# Patient Record
Sex: MALE | Race: BLACK OR AFRICAN AMERICAN | Employment: FULL TIME | URBAN - METROPOLITAN AREA
[De-identification: names, ages, dates, MRNs, and addresses within clinical notes are randomized per-mention and may not be internally consistent; named-entity substitution may affect disease eponyms.]

---

## 2017-10-20 ENCOUNTER — HOSPITAL ENCOUNTER (OUTPATIENT)
Dept: LAB | Age: 52
Discharge: HOME | End: 2017-10-20

## 2017-10-20 LAB — POTASSIUM SERPL-SCNC: 5.5 MMOL/L (ref 3.5–5.1)

## 2017-10-20 PROCEDURE — 84132 ASSAY OF SERUM POTASSIUM: CPT | Performed by: INTERNAL MEDICINE

## 2017-10-23 ENCOUNTER — HOSPITAL ENCOUNTER (OUTPATIENT)
Dept: LAB | Age: 52
Discharge: HOME | End: 2017-10-23

## 2017-10-23 LAB — POTASSIUM SERPL-SCNC: 4.4 MMOL/L (ref 3.5–5.1)

## 2017-10-23 PROCEDURE — 84132 ASSAY OF SERUM POTASSIUM: CPT | Performed by: INTERNAL MEDICINE

## 2017-12-15 ENCOUNTER — HOSPITAL ENCOUNTER (OUTPATIENT)
Dept: LAB | Age: 52
Discharge: HOME | End: 2017-12-15

## 2017-12-15 LAB — POTASSIUM SERPL-SCNC: 3.3 MMOL/L (ref 3.5–5.1)

## 2017-12-15 PROCEDURE — 84132 ASSAY OF SERUM POTASSIUM: CPT | Performed by: INTERNAL MEDICINE

## 2017-12-18 ENCOUNTER — HOSPITAL ENCOUNTER (OUTPATIENT)
Dept: LAB | Age: 52
Discharge: HOME | End: 2017-12-18

## 2017-12-18 LAB — POTASSIUM SERPL-SCNC: 5.9 MMOL/L (ref 3.5–5.1)

## 2017-12-18 PROCEDURE — 84132 ASSAY OF SERUM POTASSIUM: CPT | Performed by: INTERNAL MEDICINE

## 2017-12-29 ENCOUNTER — HOSPITAL ENCOUNTER (OUTPATIENT)
Dept: LAB | Age: 52
Discharge: HOME | End: 2017-12-29

## 2017-12-29 LAB — POTASSIUM SERPL-SCNC: 5.6 MMOL/L (ref 3.5–5.1)

## 2017-12-29 PROCEDURE — 84132 ASSAY OF SERUM POTASSIUM: CPT | Performed by: INTERNAL MEDICINE

## 2018-04-13 ENCOUNTER — HOSPITAL ENCOUNTER (OUTPATIENT)
Dept: LAB | Age: 53
Discharge: HOME | End: 2018-04-13

## 2018-04-13 LAB — POTASSIUM SERPL-SCNC: 6.1 MMOL/L (ref 3.5–5.1)

## 2018-04-13 PROCEDURE — 84132 ASSAY OF SERUM POTASSIUM: CPT | Performed by: INTERNAL MEDICINE

## 2018-04-16 ENCOUNTER — HOSPITAL ENCOUNTER (OUTPATIENT)
Dept: LAB | Age: 53
Discharge: HOME | End: 2018-04-16

## 2018-04-16 LAB — POTASSIUM SERPL-SCNC: 6 MMOL/L (ref 3.5–5.1)

## 2018-04-16 PROCEDURE — 84132 ASSAY OF SERUM POTASSIUM: CPT | Performed by: INTERNAL MEDICINE

## 2019-08-16 ENCOUNTER — HOSPITAL ENCOUNTER (OUTPATIENT)
Dept: LAB | Age: 54
Discharge: HOME | End: 2019-08-16

## 2019-08-16 LAB
COMMENT, HOLDF: NORMAL
POTASSIUM SERPL-SCNC: 6.4 MMOL/L (ref 3.5–5.1)
SAMPLES BEING HELD,HOLD: NORMAL

## 2019-08-16 PROCEDURE — 99000 SPECIMEN HANDLING OFFICE-LAB: CPT

## 2019-08-16 PROCEDURE — 84132 ASSAY OF SERUM POTASSIUM: CPT

## 2019-08-16 PROCEDURE — 73060999999 HC MISC LAB CHARGE

## 2020-04-22 ENCOUNTER — HOSPITAL ENCOUNTER (OUTPATIENT)
Dept: LAB | Age: 55
Discharge: HOME | End: 2020-04-22

## 2020-04-22 LAB — POTASSIUM SERPL-SCNC: 6 MMOL/L (ref 3.5–5.1)

## 2020-04-22 PROCEDURE — 84132 ASSAY OF SERUM POTASSIUM: CPT

## 2020-04-22 PROCEDURE — 99000 SPECIMEN HANDLING OFFICE-LAB: CPT

## 2020-05-15 ENCOUNTER — HOSPITAL ENCOUNTER (OUTPATIENT)
Dept: LAB | Age: 55
Discharge: HOME | End: 2020-05-15

## 2020-05-15 LAB — POTASSIUM SERPL-SCNC: 5 MMOL/L (ref 3.5–5.1)

## 2020-05-15 PROCEDURE — 99000 SPECIMEN HANDLING OFFICE-LAB: CPT

## 2020-05-15 PROCEDURE — 84132 ASSAY OF SERUM POTASSIUM: CPT

## 2024-03-25 ENCOUNTER — OFFICE VISIT (OUTPATIENT)
Age: 59
End: 2024-03-25
Payer: MEDICARE

## 2024-03-25 VITALS
BODY MASS INDEX: 33.37 KG/M2 | HEART RATE: 87 BPM | HEIGHT: 74 IN | SYSTOLIC BLOOD PRESSURE: 124 MMHG | TEMPERATURE: 97.5 F | OXYGEN SATURATION: 97 % | WEIGHT: 260 LBS | RESPIRATION RATE: 18 BRPM | DIASTOLIC BLOOD PRESSURE: 70 MMHG

## 2024-03-25 DIAGNOSIS — Z94.0 S/P KIDNEY TRANSPLANT: ICD-10-CM

## 2024-03-25 DIAGNOSIS — C64.9 MALIGNANT NEOPLASM OF KIDNEY, UNSPECIFIED LATERALITY (HCC): ICD-10-CM

## 2024-03-25 DIAGNOSIS — Z86.73 HISTORY OF CVA (CEREBROVASCULAR ACCIDENT): ICD-10-CM

## 2024-03-25 DIAGNOSIS — R41.3 MEMORY LOSS, LONG TERM: Primary | ICD-10-CM

## 2024-03-25 PROCEDURE — G8417 CALC BMI ABV UP PARAM F/U: HCPCS | Performed by: PSYCHIATRY & NEUROLOGY

## 2024-03-25 PROCEDURE — G8427 DOCREV CUR MEDS BY ELIG CLIN: HCPCS | Performed by: PSYCHIATRY & NEUROLOGY

## 2024-03-25 PROCEDURE — 99204 OFFICE O/P NEW MOD 45 MIN: CPT | Performed by: PSYCHIATRY & NEUROLOGY

## 2024-03-25 PROCEDURE — 4004F PT TOBACCO SCREEN RCVD TLK: CPT | Performed by: PSYCHIATRY & NEUROLOGY

## 2024-03-25 PROCEDURE — G8484 FLU IMMUNIZE NO ADMIN: HCPCS | Performed by: PSYCHIATRY & NEUROLOGY

## 2024-03-25 PROCEDURE — 3078F DIAST BP <80 MM HG: CPT | Performed by: PSYCHIATRY & NEUROLOGY

## 2024-03-25 PROCEDURE — 3074F SYST BP LT 130 MM HG: CPT | Performed by: PSYCHIATRY & NEUROLOGY

## 2024-03-25 PROCEDURE — 3017F COLORECTAL CA SCREEN DOC REV: CPT | Performed by: PSYCHIATRY & NEUROLOGY

## 2024-03-25 RX ORDER — OMEPRAZOLE 40 MG/1
40 CAPSULE, DELAYED RELEASE ORAL DAILY
COMMUNITY
Start: 2024-01-16

## 2024-03-25 RX ORDER — PREDNISONE 5 MG/1
5 TABLET ORAL EVERY MORNING
COMMUNITY
Start: 2024-03-19

## 2024-03-25 RX ORDER — ISOSORBIDE MONONITRATE 120 MG/1
120 TABLET, EXTENDED RELEASE ORAL EVERY MORNING
COMMUNITY
Start: 2024-01-14

## 2024-03-25 RX ORDER — MYCOPHENOLATE MOFETIL 250 MG/1
250 CAPSULE ORAL 2 TIMES DAILY
COMMUNITY
Start: 2024-02-13

## 2024-03-25 RX ORDER — AMLODIPINE BESYLATE 10 MG/1
10 TABLET ORAL DAILY
COMMUNITY

## 2024-03-25 RX ORDER — TACROLIMUS 1 MG/1
2 CAPSULE ORAL 2 TIMES DAILY
COMMUNITY
Start: 2024-02-28

## 2024-03-25 RX ORDER — HYDRALAZINE HYDROCHLORIDE 10 MG/1
10 TABLET, FILM COATED ORAL 3 TIMES DAILY
COMMUNITY
Start: 2024-01-21

## 2024-03-25 NOTE — PROGRESS NOTES
Motor:  5/5 strength in upper and lower proximal and distal muscles. Left forearm AV fistula. Normal bulk and tone. No fasciculations. No pronator drift.    Reflexes:   Deep tendon reflexes 1+/4 and symmetrical. Downgoing toes.   Sensory:   Normal to cold, pinprick and vibration.   Gait:  Normal gait. No Romberg.    Tremor:   No tremor noted.   Cerebellar:  Intact FTN/NATALIA/HTS.   Neurovascular:  Normal heart sounds and regular rhythm, peripheral pulses intact, and no carotid bruits.       Assessment:      Diagnosis Orders   1. Memory loss, long term  [facility] - Joselyn Velazquez MD, Neurology, [place]    MRI BRAIN WO CONTRAST      2. History of CVA (cerebrovascular accident)  MRI BRAIN WO CONTRAST      3. Malignant neoplasm of kidney, unspecified laterality (HCC)  MRI BRAIN WO CONTRAST      4. S/P kidney transplant  MRI BRAIN WO CONTRAST           Plan:   Neurological examination was nonfocal.  Cognitive testing was done patient scored 28/30.  Problems with exact date and immediate recall.  Improved with prodding.  Patient has multiple risk factors for emerging dementia including prior exposure to hemodialysis, prior stroke, hypertension and renal cancer.  Brain MRI without contrast was ordered to further assess.  I need to obtain a copy of his medical records from his PCP.  Brain check was ordered care of my colleague Dr. Velazquez to do a more extensive evaluation of his cognitive function.  If that is abnormal then we will proceed with formal neuropsychological testing.  Recommend routine structured mental and physical exercises.  Further intervention be done pending results of testing.    Patient with prior history in 2015 that led to the diagnosis of kidney disease and need for hemodialysis.  No residual deficits.  Brain MRI without contrast was ordered as above to further assess.  Continue aspirin 81 mg daily for stroke prophylaxis.  Call 911 if new deficits occur.    Patient with prior history of kidney cancer status

## 2024-04-03 ENCOUNTER — TELEPHONE (OUTPATIENT)
Age: 59
End: 2024-04-03

## 2024-04-03 DIAGNOSIS — R41.3 MEMORY LOSS, LONG TERM: Primary | ICD-10-CM

## 2024-04-03 DIAGNOSIS — Z86.73 HISTORY OF CVA (CEREBROVASCULAR ACCIDENT): ICD-10-CM

## 2024-04-03 RX ORDER — LORAZEPAM 2 MG/1
TABLET ORAL
Qty: 2 TABLET | Refills: 0 | Status: SHIPPED | OUTPATIENT
Start: 2024-04-03 | End: 2024-05-03

## 2024-04-03 NOTE — TELEPHONE ENCOUNTER
Called patient, LM, that Dr. David has sent prescription for Ativan to the pharmacy. If nay questions please call back.

## 2024-04-03 NOTE — TELEPHONE ENCOUNTER
Patient is calling to see if he can get a medication prescribed to him for his MRI, to help calm his nerves/anxiety.    Please advise.

## 2024-04-10 ENCOUNTER — OFFICE VISIT (OUTPATIENT)
Age: 59
End: 2024-04-10
Payer: MEDICARE

## 2024-04-10 ENCOUNTER — TELEPHONE (OUTPATIENT)
Age: 59
End: 2024-04-10

## 2024-04-10 VITALS
SYSTOLIC BLOOD PRESSURE: 138 MMHG | DIASTOLIC BLOOD PRESSURE: 81 MMHG | HEART RATE: 80 BPM | OXYGEN SATURATION: 95 % | RESPIRATION RATE: 18 BRPM

## 2024-04-10 DIAGNOSIS — R41.3 MEMORY DIFFICULTIES: Primary | ICD-10-CM

## 2024-04-10 PROCEDURE — 96132 NRPSYC TST EVAL PHYS/QHP 1ST: CPT | Performed by: PSYCHIATRY & NEUROLOGY

## 2024-04-10 PROCEDURE — 96138 PSYCL/NRPSYC TECH 1ST: CPT | Performed by: PSYCHIATRY & NEUROLOGY

## 2024-04-10 NOTE — PATIENT INSTRUCTIONS
Neurocognitive consult/testing procedure:    Please contact office after scheduling with one of the facilities listed below with whom you are seeing, date and time of appt and we will fax orders and notes tho that facility and schedule your follow up with our office after testing.    [facility]  Dr. Dany Solares  [address]  [facility] [address]  (P) [phone]    [email or web address]  Dr. Eli Villareal  [address] [facility] [address]  [facility] [address]  (P) [phone]  (F) [phone]    [facility] ([email or web address]  Dr. Patience Duke  [address] [place] [address].  [place] [address]  Office: [phone]   Fax: [phone]    [facility]   Dr. Latha Wong  [address] [place], [place] [address]  (P) [phone]  (F) [phone]

## 2024-04-10 NOTE — PROGRESS NOTES
Cognitive Testing Evaluation      Introduction:      Jefferson Mendoza Jr  1965  Male   This 58 year old Male was administered a battery of neurocognitive testing on 04/10/2024.      Tests Administered:      Trails A, Trails B, Digit Symbol Substitution, Stroop, Immediate Recognition, Delayed Recognition   The combined test administration time was 13 minutes   Test Results:   Cognitive testing was provided via a battery of cognitive assessments. The pattern of test scores indicate that results are valid.  A Clinical Report with further description of scores and results is also available.   Overall: Patient tested in the 24th percentile (standard score of 89).   Trails A: Patient tested in the 70th percentile (scaled standard score of 108).  Trails B: Patient tested in the 36th percentile (scaled standard score of 95).  Digit Symbol Substitution: Patient tested in the 56th percentile (scaled standard score of 102).  Stroop: Patient tested in the 43rd percentile (scaled standard score of 97).  Immediate Recognition: Patient tested in the 1st percentile (scaled standard score of 60).  Delayed Recognition: Patient tested in the 25th percentile (scaled standard score of 90).      Interpretation of Test Scores:      Examination of individual component tests shows:   Attention - Trails A: unlikely impairment  Mental Flexibility - Trails B: unlikely impairment  Executive Function - Digit Symbol Substitution: unlikely impairment  Executive Function - Stroop: unlikely impairment  Memory - Immediate Recognition: likely impairment  Memory - Delayed Recognition: unlikely impairment     The patient’s overall cognitive test performance was in the 24th percentile when compared to individuals of a similar age and gender, suggesting possible presence of cognitive impairment.    Mr. Mendoza is with his wife today.  He reports he completed a bachelor of arts degree.  We discussed his results on the cognitive evaluation and he scored

## 2024-04-10 NOTE — TELEPHONE ENCOUNTER
Pt's wife, Sharri, called to schedule appt for Pt. Advised scheduling is backed up 1-2 weeks but someone will be in contact to set up the appts as soon as they can. Sharri verbalized understanding and asked that we please contact Pt directly. Please call [phone]

## 2024-04-10 NOTE — PROGRESS NOTES
[address] (16 minute minimum)  _16_ minutes were spent administering cognitive testing by medical assistant/nurse.   Cognitive Testing Evaluation     Introduction:     Jefferson Mendoza Jr  1965  Male   This 58 year old Male was administered a battery of neurocognitive testing on 04/10/2024.     Tests Administered:     Trails A, Trails B, Digit Symbol Substitution, Stroop, Immediate Recognition, Delayed Recognition   The combined test administration time was 13 minutes   Test Results:   Cognitive testing was provided via a battery of cognitive assessments. The pattern of test scores indicate that results are valid.  A Clinical Report with further description of scores and results is also available.   Overall: Patient tested in the 24th percentile (standard score of 89).   Trails A: Patient tested in the 70th percentile (scaled standard score of 108).  Trails B: Patient tested in the 36th percentile (scaled standard score of 95).  Digit Symbol Substitution: Patient tested in the 56th percentile (scaled standard score of 102).  Stroop: Patient tested in the 43rd percentile (scaled standard score of 97).  Immediate Recognition: Patient tested in the 1st percentile (scaled standard score of 60).  Delayed Recognition: Patient tested in the 25th percentile (scaled standard score of 90).      Interpretation of Test Scores:     Examination of individual component tests shows:   Attention - Trails A: unlikely impairment  Mental Flexibility - Trails B: unlikely impairment  Executive Function - Digit Symbol Substitution: unlikely impairment  Executive Function - Stroop: unlikely impairment  Memory - Immediate Recognition: likely impairment  Memory - Delayed Recognition: unlikely impairment    The patient’s overall cognitive test performance was in the 24th percentile when compared to individuals of a similar age and gender, suggesting possible presence of cognitive impairment.

## 2024-04-15 ENCOUNTER — HOSPITAL ENCOUNTER (OUTPATIENT)
Facility: HOSPITAL | Age: 59
Discharge: HOME | End: 2024-04-18
Attending: PSYCHIATRY & NEUROLOGY
Payer: MEDICARE

## 2024-04-15 DIAGNOSIS — C64.9 MALIGNANT NEOPLASM OF KIDNEY, UNSPECIFIED LATERALITY (HCC): ICD-10-CM

## 2024-04-15 DIAGNOSIS — R41.3 MEMORY LOSS, LONG TERM: ICD-10-CM

## 2024-04-15 DIAGNOSIS — Z94.0 S/P KIDNEY TRANSPLANT: ICD-10-CM

## 2024-04-15 DIAGNOSIS — Z86.73 HISTORY OF CVA (CEREBROVASCULAR ACCIDENT): ICD-10-CM

## 2024-04-15 PROCEDURE — 70551 MRI BRAIN STEM W/O DYE: CPT

## 2024-04-18 ENCOUNTER — TELEPHONE (OUTPATIENT)
Age: 59
End: 2024-04-18

## 2024-04-18 NOTE — TELEPHONE ENCOUNTER
Called patient LM, Dr. David would to schedule an appt to discuss MRI results can can do 4/22/2024 at 10:40am or 2:40pm. Please call back with what time would be good for you or if that date does not work.

## 2024-04-18 NOTE — TELEPHONE ENCOUNTER
----- Message from Wayne David Jr., MD sent at 4/18/2024  2:17 PM EDT -----  Regarding: f/u. discuss MRI results  Follow up 4/22/2024 at 10:40 or 2:40 pm

## 2024-04-22 ENCOUNTER — TELEPHONE (OUTPATIENT)
Age: 59
End: 2024-04-22

## 2024-04-22 NOTE — TELEPHONE ENCOUNTER
Called patient, VERONICA verified, can schedule appointment tomorrow 2024 at 9am to discuss MRI results. Patient stated he will not be able to make that appointment due to he has another appointment. Informed patient will let Dr. David know.

## 2024-04-29 ENCOUNTER — OFFICE VISIT (OUTPATIENT)
Age: 59
End: 2024-04-29
Payer: MEDICARE

## 2024-04-29 VITALS
OXYGEN SATURATION: 92 % | HEART RATE: 86 BPM | DIASTOLIC BLOOD PRESSURE: 72 MMHG | SYSTOLIC BLOOD PRESSURE: 116 MMHG | RESPIRATION RATE: 18 BRPM

## 2024-04-29 DIAGNOSIS — R90.82 WHITE MATTER DISEASE: ICD-10-CM

## 2024-04-29 DIAGNOSIS — G31.84 MILD COGNITIVE IMPAIRMENT: Primary | ICD-10-CM

## 2024-04-29 DIAGNOSIS — Z86.73 HISTORY OF CVA (CEREBROVASCULAR ACCIDENT): ICD-10-CM

## 2024-04-29 DIAGNOSIS — Z94.0 S/P KIDNEY TRANSPLANT: ICD-10-CM

## 2024-04-29 PROCEDURE — 99214 OFFICE O/P EST MOD 30 MIN: CPT | Performed by: PSYCHIATRY & NEUROLOGY

## 2024-04-29 PROCEDURE — G8427 DOCREV CUR MEDS BY ELIG CLIN: HCPCS | Performed by: PSYCHIATRY & NEUROLOGY

## 2024-04-29 PROCEDURE — G8417 CALC BMI ABV UP PARAM F/U: HCPCS | Performed by: PSYCHIATRY & NEUROLOGY

## 2024-04-29 PROCEDURE — 3078F DIAST BP <80 MM HG: CPT | Performed by: PSYCHIATRY & NEUROLOGY

## 2024-04-29 PROCEDURE — 3074F SYST BP LT 130 MM HG: CPT | Performed by: PSYCHIATRY & NEUROLOGY

## 2024-04-29 PROCEDURE — 4004F PT TOBACCO SCREEN RCVD TLK: CPT | Performed by: PSYCHIATRY & NEUROLOGY

## 2024-04-29 PROCEDURE — 3017F COLORECTAL CA SCREEN DOC REV: CPT | Performed by: PSYCHIATRY & NEUROLOGY

## 2024-04-29 ASSESSMENT — PATIENT HEALTH QUESTIONNAIRE - PHQ9
SUM OF ALL RESPONSES TO PHQ QUESTIONS 1-9: 0
SUM OF ALL RESPONSES TO PHQ QUESTIONS 1-9: 0
SUM OF ALL RESPONSES TO PHQ9 QUESTIONS 1 & 2: 0
1. LITTLE INTEREST OR PLEASURE IN DOING THINGS: NOT AT ALL
SUM OF ALL RESPONSES TO PHQ QUESTIONS 1-9: 0
2. FEELING DOWN, DEPRESSED OR HOPELESS: NOT AT ALL
SUM OF ALL RESPONSES TO PHQ QUESTIONS 1-9: 0

## 2024-04-29 NOTE — PROGRESS NOTES
NEUROLOGY CLINIC NOTE    Patient ID:  Jefferson Mendoza Jr.  [id number]  58 y.o.  1965    Date of Visit:  April 29, 2024    Referring Physician: Shabana Paris NP    Reason for Visit: memory issues    Chief Complaint   Patient presents with    Follow-up    Results     Patient is here for MRI results.        History of Present Illness:     Patient Active Problem List    Diagnosis Date Noted    Oliguria 08/22/2015    Chest pain 08/20/2015    Acute on chronic renal failure (HCC) 08/20/2015    Non compliance with medical treatment 08/20/2015    Hypertensive emergency 08/19/2015     Past Medical History:   Diagnosis Date    Chronic kidney disease     Heart failure (HCC)     Hypertension     Stroke (HCC)     9/2015; no residual      Past Surgical History:   Procedure Laterality Date    VASCULAR SURGERY      chest      Current Outpatient Medications on File Prior to Visit   Medication Sig Dispense Refill    LORazepam (ATIVAN) 2 MG tablet Take 1 tab 30 min to 1 hour prior to MRI; may take another dose if necessary 2 tablet 0    isosorbide mononitrate (IMDUR) 120 MG extended release tablet Take 1 tablet by mouth every morning      tacrolimus (PROGRAF) 1 MG capsule Take 2 capsules by mouth 2 times daily      predniSONE (DELTASONE) 5 MG tablet Take 1 tablet by mouth every morning      omeprazole (PRILOSEC) 40 MG delayed release capsule Take 1 capsule by mouth daily      mycophenolate (CELLCEPT) 250 MG capsule Take 1 capsule by mouth 2 times daily      hydrALAZINE (APRESOLINE) 10 MG tablet Take 1 tablet by mouth 3 times daily      amLODIPine (NORVASC) 10 MG tablet Take 1 tablet by mouth daily      aspirin 81 MG chewable tablet Take 1 tablet by mouth daily      atorvastatin (LIPITOR) 40 MG tablet Take 1 tablet by mouth daily      carvedilol (COREG) 25 MG tablet Take 1 tablet by mouth 2 times daily (with meals)       No current facility-administered medications on file prior to visit.       No Known Allergies   Social History

## 2024-09-10 ENCOUNTER — TELEPHONE (OUTPATIENT)
Age: 59
End: 2024-09-10

## 2024-09-10 NOTE — TELEPHONE ENCOUNTER
Patient spouse Sharri, calling to reschedule all three of his appts. They state he has to go out of town due to a family situation. Please call her back at . Thank you.

## 2025-03-25 ENCOUNTER — OFFICE VISIT (OUTPATIENT)
Age: 60
End: 2025-03-25
Payer: MEDICARE

## 2025-03-25 ENCOUNTER — TELEPHONE (OUTPATIENT)
Age: 60
End: 2025-03-25

## 2025-03-25 DIAGNOSIS — Z86.73 HISTORY OF CVA (CEREBROVASCULAR ACCIDENT): ICD-10-CM

## 2025-03-25 DIAGNOSIS — G31.84 MILD COGNITIVE IMPAIRMENT: Primary | ICD-10-CM

## 2025-03-25 DIAGNOSIS — Z94.0 S/P KIDNEY TRANSPLANT: ICD-10-CM

## 2025-03-25 PROCEDURE — 90791 PSYCH DIAGNOSTIC EVALUATION: CPT | Performed by: CLINICAL NEUROPSYCHOLOGIST

## 2025-03-25 PROCEDURE — 4004F PT TOBACCO SCREEN RCVD TLK: CPT | Performed by: CLINICAL NEUROPSYCHOLOGIST

## 2025-03-25 NOTE — TELEPHONE ENCOUNTER
Received a call from patient thinking about rescheduling his testing appointment on 4/8/25.  He is curious when the next available date would be.  He requests a call back.

## 2025-03-25 NOTE — PROGRESS NOTES
Intake Note      Patient Name: Jefferson Mendoza Jr.  YOB: 1965    Age: 59 y.o.  Date of Intake: 3/25/2025   Education: 16 Ethnicity Black   Gender: Male Referring Provider: Dr. Velazquez     REASON FOR REFERRAL AND EVALUATION PROCEDURES:  Jefferson Mendoza Jr.  was referred for evaluation by his Neurologist to assist in differential diagnosis and individualized treatment planning. he understood the rationale and procedures for evaluation, as well as the limits to confidentiality, and agreed to participate. he consented to have this report made available to his  treating providers through his  electronic medical records.   History Sources: Patient and Medical Record    HISTORY OF PRESENT ILLNESS:  The patient is a 59-year-old male with pertinent medical history noted for hypertensive emergency, acute on chronic renal failure, heart failure, hypertension, stroke, and noncompliance with medical treatment.  He presented independently for clinical interview.  His insight and ability as a historian appeared to be fair but his report could not be corroborated by collateral sources aside from available medical records.  Per the patient's report, he believes his cognitive functioning to be adequate and appropriate for the aging process.  However, he reported his family is concerned about changes they are observing.  According to the patient's medical records, the patient sustained small chronic infarcts in the left frontal lobe around 2015, reportedly secondary to kidney failure.  In 2016, it was discovered he had kidney cancer and both kidneys were removed, requiring dialysis until 2023, when he had a kidney transplant.  The patient also underwent CABG in 2020.  The patient's initial neurology note (3/25/2024) documents the patient mentioned changes in his cognitive functioning for approximately 6 months to 1 year but today he tells me family has been noticing changes since his stroke in 2016.  He reported his family

## 2025-04-02 ENCOUNTER — PROCEDURE VISIT (OUTPATIENT)
Age: 60
End: 2025-04-02

## 2025-04-02 DIAGNOSIS — F43.22 ADJUSTMENT DISORDER WITH ANXIOUS MOOD: ICD-10-CM

## 2025-04-02 DIAGNOSIS — Z76.89 SLEEP CONCERN: ICD-10-CM

## 2025-04-02 DIAGNOSIS — R41.9 NEUROCOGNITIVE DISORDER: Primary | ICD-10-CM

## 2025-04-02 DIAGNOSIS — Z94.0 S/P KIDNEY TRANSPLANT: ICD-10-CM

## 2025-04-02 DIAGNOSIS — Z86.73 HISTORY OF CVA (CEREBROVASCULAR ACCIDENT): ICD-10-CM

## 2025-04-17 ENCOUNTER — OFFICE VISIT (OUTPATIENT)
Age: 60
End: 2025-04-17

## 2025-04-17 DIAGNOSIS — F43.22 ADJUSTMENT DISORDER WITH ANXIOUS MOOD: ICD-10-CM

## 2025-04-17 DIAGNOSIS — R41.9 NEUROCOGNITIVE DISORDER: Primary | ICD-10-CM

## 2025-04-17 DIAGNOSIS — Z76.89 SLEEP CONCERN: ICD-10-CM

## 2025-04-17 DIAGNOSIS — Z86.73 HISTORY OF CVA (CEREBROVASCULAR ACCIDENT): ICD-10-CM

## 2025-04-17 NOTE — PROGRESS NOTES
Neuropsychological Evaluation Report      Patient Name: Jefferson Mednoza Jr.  YOB: 1965    Age: 59 y.o.  Date of Intake: 3/25/2025   Education: 16 Date of Testin2025   Gender: Male Ethnicity: Black     Referring Provider: Dr. Velazquez     REASON FOR REFERRAL AND EVALUATION PROCEDURES:  Jefferson Mendoza Jr.  was referred for neuropsychological evaluation by his Neurologist to obtain a quantitative assessment of his current level of neurocognitive functioning, assist in differential diagnosis, and aid in individualized treatment planning. The patient understood the rationale and procedures for evaluation, as well as the limits to confidentiality, and they agreed to participate. he consented to have this report made available to his  treating providers through his  electronic medical records. This evaluation was completed with the patient by Dany Gomez PsyD with the exception of testing by technician, which was completed by BRAYAN Sahni under the supervision of Dr. Gomez.  History Sources: Patient, Medical Record, and Test Data    SUMMARY AND IMPRESSION:  The following section is a summary of the patient's pertinent test results and impressions. A more thorough review of the patient's background and test scores can be found below.    Results from the patient's neuropsychological evaluation revealed consistent moderate impairment in that domain of expressive language.  One test of verbal learning, memory, and recognition (verbal list) was also significantly impaired while other learning and memory tests were within normal limits.  One test of executive functioning (problem-solving) was moderately impaired while set switching and response inhibition were within normal limits.  Complex attention and working memory were compromised while basic attention was within normal limits, albeit inefficient.  Mental processing speed was inefficient.  Visuospatial functioning was within normal limits.

## 2025-04-17 NOTE — PROGRESS NOTES
Chief complaint: Patient presented for review of previously completed neuropsychological evaluation and discussion of impressions/recommendations.    Prior to seeing the patient for today's visit, I reviewed pertinent records, including the previously completed report, the records in Epic, and any updated visits from other providers since the patient's last visit.    I provided feedback services related to the previously completed report. Attendees included: Patient and Spouse. Education was provided regarding my diagnostic impressions, and we discussed treatment plan/options. Attendees were provided with the opportunity to ask questions, which were answered to the best of my ability.    We discussed, in detail, the following:  Reviewed findings from evaluation including test results, diagnosis, and suspected contributing factors  Discussed recommendations outlined in report  Answered questions to the best of my ability    The patient needs to:   Follow up with referring provider for ongoing management, Use behavioral strategies to improve sleep (See handout), Use practical strategies to compensate for cognitive weaknesses (See handout), and Emphasize modifiable risk and protective factors for cognitive functioning (e.g., exercise, diet, cognitive stimulation; see handout)    The patient had the following reactions to recommendations: The patient and his wife reported understanding results and recommendations    ASSESSMENT:   Diagnosis Orders   1. Neurocognitive disorder        2. History of CVA (cerebrovascular accident)        3. Sleep concern - Possible DOUGLAS        4. Adjustment disorder with anxious mood            TIME SPENT PROVIDING SERVICES:   31 minutes     BILLING:  96132 x 1 Units    *Code 70524 Neuropsychological testing evaluation services include: Integration of patient data, interpretation of standardized test results and clinical data, clinical decision-making, treatment planning and report, and